# Patient Record
Sex: FEMALE | Race: BLACK OR AFRICAN AMERICAN | URBAN - METROPOLITAN AREA
[De-identification: names, ages, dates, MRNs, and addresses within clinical notes are randomized per-mention and may not be internally consistent; named-entity substitution may affect disease eponyms.]

---

## 2021-03-09 ENCOUNTER — OFFICE VISIT (OUTPATIENT)
Dept: URBAN - METROPOLITAN AREA CLINIC 92 | Facility: CLINIC | Age: 61
End: 2021-03-09
Payer: COMMERCIAL

## 2021-03-09 DIAGNOSIS — Z86.010 PERSONAL HISTORY OF COLONIC POLYPS: ICD-10-CM

## 2021-03-09 DIAGNOSIS — K59.01 CONSTIPATION BY DELAYED COLONIC TRANSIT: ICD-10-CM

## 2021-03-09 DIAGNOSIS — R10.12 LEFT UPPER QUADRANT ABDOMINAL PAIN: ICD-10-CM

## 2021-03-09 PROBLEM — 428283002: Status: ACTIVE | Noted: 2021-03-09

## 2021-03-09 PROCEDURE — 99214 OFFICE O/P EST MOD 30 MIN: CPT | Performed by: INTERNAL MEDICINE

## 2021-03-09 NOTE — HPI-OTHER HISTORIES
(Sept 2019) The patient is a 58 year old  female , who presents on referral from Kristen Darling MD , for a gastroenterology evaluation for change in bowel habits . A copy of this document will be sent to the referring provider. The patient notes a change in bowel movements. Prior to three months ago bowel movements were a few times a day but now occurs every few days. A colonoscopy 2017 by Dr. Salvador Mitchell after her small bowel obstruction was normal. She adheres to a predominantly plant-based diet. She admits to hormonal changes due to menopause.

## 2021-03-09 NOTE — HPI-TODAY'S VISIT:
The patient is here for abdominal pain and constipation January 2021.  She was having bowel movements daily but was not having the sensation of complete emptying.  She took Linzess 145 mcg which she had not taken in several months.  After 2 days of taking it once a day her symptoms resolved.  Her last colonoscopy was performed in 2017 by Salvador Mitchell was unremarkable.  Repeat colonoscopy was recommended in 5 years due to personal history of colon polyps.

## 2022-02-15 ENCOUNTER — OFFICE VISIT (OUTPATIENT)
Dept: URBAN - METROPOLITAN AREA CLINIC 92 | Facility: CLINIC | Age: 62
End: 2022-02-15
Payer: COMMERCIAL

## 2022-02-15 ENCOUNTER — DASHBOARD ENCOUNTERS (OUTPATIENT)
Age: 62
End: 2022-02-15

## 2022-02-15 ENCOUNTER — WEB ENCOUNTER (OUTPATIENT)
Dept: URBAN - METROPOLITAN AREA CLINIC 92 | Facility: CLINIC | Age: 62
End: 2022-02-15

## 2022-02-15 VITALS
HEART RATE: 55 BPM | HEIGHT: 64 IN | TEMPERATURE: 96.4 F | BODY MASS INDEX: 30.39 KG/M2 | WEIGHT: 178 LBS | SYSTOLIC BLOOD PRESSURE: 128 MMHG | DIASTOLIC BLOOD PRESSURE: 81 MMHG

## 2022-02-15 DIAGNOSIS — R10.12 LUQ ABDOMINAL PAIN: ICD-10-CM

## 2022-02-15 DIAGNOSIS — K59.01 CONSTIPATION BY DELAYED COLONIC TRANSIT: ICD-10-CM

## 2022-02-15 DIAGNOSIS — Z86.010 PERSONAL HISTORY OF COLON POLYPS: ICD-10-CM

## 2022-02-15 PROCEDURE — 99214 OFFICE O/P EST MOD 30 MIN: CPT | Performed by: INTERNAL MEDICINE

## 2022-02-15 RX ORDER — SODIUM PICOSULFATE, MAGNESIUM OXIDE, AND ANHYDROUS CITRIC ACID 10; 3.5; 12 MG/160ML; G/160ML; G/160ML
160 ML LIQUID ORAL
Qty: 320 MILLILITER | Refills: 0 | OUTPATIENT
Start: 2022-02-15 | End: 2022-02-16

## 2022-02-15 NOTE — HPI-TODAY'S VISIT:
This is a 60 yo female referred by Dr. Yanely Payan for abdominal pain.  A copy of this document garcía be sent to the primary provider.  The patient has a history of SBO with lysis of adhesions 2017  and 2018 however prior to  this she had multiple admissions for SBO treated conservatively.  Two months ago she requested appointment for LUQ pain.  Presuming it was another bowel obstruction she had clear liquids for 5 days and slowly advancing her diet her symptoms resolved.  She has been asymptomatic for 3 weeks.  She has noticed her symptoms are exacerbated with red meat and bread. Per the records she has a history of colon polyps.  Her last colonoscopy 2017 by Dr. Salvador Mitchell was unremarkable.  There is no family history of coon cancer or colon polyps. The patient also reports constipation. However when she increases vegetables and avoids red meat and bread bowel movements occur daily.

## 2022-04-09 PROBLEM — 35298007: Status: ACTIVE | Noted: 2021-03-09

## 2022-04-26 ENCOUNTER — OFFICE VISIT (OUTPATIENT)
Dept: URBAN - METROPOLITAN AREA SURGERY CENTER 16 | Facility: SURGERY CENTER | Age: 62
End: 2022-04-26
Payer: COMMERCIAL

## 2022-04-26 DIAGNOSIS — Z86.010 ADENOMAS PERSONAL HISTORY OF COLONIC POLYPS: ICD-10-CM

## 2022-04-26 PROCEDURE — G8907 PT DOC NO EVENTS ON DISCHARG: HCPCS | Performed by: INTERNAL MEDICINE

## 2022-04-26 PROCEDURE — G0105 COLORECTAL SCRN; HI RISK IND: HCPCS | Performed by: INTERNAL MEDICINE

## 2022-04-30 ENCOUNTER — TELEPHONE ENCOUNTER (OUTPATIENT)
Dept: URBAN - METROPOLITAN AREA CLINIC 121 | Facility: CLINIC | Age: 62
End: 2022-04-30

## 2022-05-01 ENCOUNTER — TELEPHONE ENCOUNTER (OUTPATIENT)
Dept: URBAN - METROPOLITAN AREA CLINIC 121 | Facility: CLINIC | Age: 62
End: 2022-05-01

## 2022-05-01 RX ORDER — CHROMIUM 200 MCG
ONCE A WEEK TABLET ORAL
Status: ACTIVE | COMMUNITY
Start: 2012-02-08

## 2024-11-05 ENCOUNTER — OFFICE VISIT (OUTPATIENT)
Dept: URBAN - METROPOLITAN AREA CLINIC 92 | Facility: CLINIC | Age: 64
End: 2024-11-05
Payer: COMMERCIAL

## 2024-11-05 VITALS
BODY MASS INDEX: 31.69 KG/M2 | HEART RATE: 60 BPM | WEIGHT: 185.6 LBS | SYSTOLIC BLOOD PRESSURE: 114 MMHG | DIASTOLIC BLOOD PRESSURE: 71 MMHG | HEIGHT: 64 IN | TEMPERATURE: 97 F

## 2024-11-05 DIAGNOSIS — R10.12 LUQ ABDOMINAL PAIN: ICD-10-CM

## 2024-11-05 DIAGNOSIS — K59.01 CONSTIPATION BY DELAYED COLONIC TRANSIT: ICD-10-CM

## 2024-11-05 PROCEDURE — 99215 OFFICE O/P EST HI 40 MIN: CPT | Performed by: INTERNAL MEDICINE

## 2024-11-05 RX ORDER — VITAMIN B COMPLEX
AS DIRECTED CAPSULE ORAL
Status: ACTIVE | COMMUNITY

## 2024-11-05 RX ORDER — CHROMIUM 200 MCG
ONCE A WEEK TABLET ORAL
Status: ACTIVE | COMMUNITY
Start: 2012-02-08

## 2024-11-05 NOTE — HPI-TODAY'S VISIT:
This is a 62 yo female referred by Dr. Yanely Payan for abdominal pain.  A copy of this document garcía be sent to the primary provider.  The patient has a history of SBO with lysis of adhesions 2017  and 2018 however prior to  this she had multiple admissions for SBO treated conservatively.  Two months ago she requested appointment for LUQ pain.  Presuming it was another bowel obstruction she had clear liquids for 5 days and slowly advancing her diet her symptoms resolved.  She has been asymptomatic for 3 weeks.  She has noticed her symptoms are exacerbated with red meat and bread. Per the records she has a history of colon polyps.  Her last colonoscopy 2017 by Dr. Salvador Mitchell was unremarkable.  There is no family history of coon cancer or colon polyps. The patient also reports constipation. However when she increases vegetables and avoids red meat and bread bowel movements occur daily.  ***11/2024: Presented to [facility] ED 3 days ago with LUQ abdominal pain, NV for one day. One small BM at that time and 3 episodes of vomiting. Some distention. Labs notable for WBC 12. CT with no acute abnormality. Still adhering to a mostly liquid diet, and no BMs since her ER visit. Overall feeling better but worried will worsen. No sick contacts. Sxs started after ate a few salads.

## 2025-01-22 ENCOUNTER — OFFICE VISIT (OUTPATIENT)
Dept: URBAN - METROPOLITAN AREA CLINIC 92 | Facility: CLINIC | Age: 65
End: 2025-01-22

## 2025-01-22 RX ORDER — CHROMIUM 200 MCG
ONCE A WEEK TABLET ORAL
Status: ACTIVE | COMMUNITY
Start: 2012-02-08

## 2025-01-22 RX ORDER — VITAMIN B COMPLEX
AS DIRECTED CAPSULE ORAL
Status: ACTIVE | COMMUNITY

## 2025-01-22 NOTE — HPI-TODAY'S VISIT:
This is a 60 yo female referred by Dr. Yanely Payan for abdominal pain.  A copy of this document garcía be sent to the primary provider.  The patient has a history of SBO with lysis of adhesions 2017  and 2018 however prior to  this she had multiple admissions for SBO treated conservatively.  Two months ago she requested appointment for LUQ pain.  Presuming it was another bowel obstruction she had clear liquids for 5 days and slowly advancing her diet her symptoms resolved.  She has been asymptomatic for 3 weeks.  She has noticed her symptoms are exacerbated with red meat and bread. Per the records she has a history of colon polyps.  Her last colonoscopy 2017 by Dr. Salvador Mitchell was unremarkable.  There is no family history of coon cancer or colon polyps. The patient also reports constipation. However when she increases vegetables and avoids red meat and bread bowel movements occur daily.  ***11/2024: Presented to [facility] ED 3 days ago with LUQ abdominal pain, NV for one day. One small BM at that time and 3 episodes of vomiting. Some distention. Labs notable for WBC 12. CT with no acute abnormality. Still adhering to a mostly liquid diet, and no BMs since her ER visit. Overall feeling better but worried will worsen. No sick contacts. Sxs started after ate a few salads. Last colonoscopy was 4-2022 this demonstrated no abnormalities

## 2025-01-24 ENCOUNTER — OFFICE VISIT (OUTPATIENT)
Dept: URBAN - METROPOLITAN AREA CLINIC 92 | Facility: CLINIC | Age: 65
End: 2025-01-24
Payer: COMMERCIAL

## 2025-01-24 VITALS
HEART RATE: 67 BPM | BODY MASS INDEX: 32.58 KG/M2 | HEIGHT: 64 IN | DIASTOLIC BLOOD PRESSURE: 60 MMHG | WEIGHT: 190.8 LBS | TEMPERATURE: 96.4 F | SYSTOLIC BLOOD PRESSURE: 110 MMHG

## 2025-01-24 DIAGNOSIS — Z87.19 HISTORY OF SMALL BOWEL OBSTRUCTION: ICD-10-CM

## 2025-01-24 DIAGNOSIS — R14.2 BELCHING: ICD-10-CM

## 2025-01-24 DIAGNOSIS — R14.0 ABDOMINAL BLOATING: ICD-10-CM

## 2025-01-24 PROCEDURE — 99214 OFFICE O/P EST MOD 30 MIN: CPT

## 2025-01-24 RX ORDER — VITAMIN B COMPLEX
AS DIRECTED CAPSULE ORAL
Status: ACTIVE | COMMUNITY

## 2025-01-24 RX ORDER — CHROMIUM 200 MCG
ONCE A WEEK TABLET ORAL
Status: ACTIVE | COMMUNITY
Start: 2012-02-08

## 2025-01-24 RX ORDER — PROGESTERONE 100 MG/1
1 CAPSULE AT BEDTIME CAPSULE ORAL ONCE A DAY
Status: ACTIVE | COMMUNITY

## 2025-01-24 RX ORDER — ESTRADIOL 0.05 MG/D
1 PATCH TO SKIN PATCH TRANSDERMAL
Status: ACTIVE | COMMUNITY

## 2025-01-24 NOTE — HPI-TODAY'S VISIT:
64-year-old female patient presents today for abdominal bloating.   She has history of SBOs that was originally diagnosed in 2017.  She was hospitalized multiple times after for SBOs and treated conservatively.  In 2018 she had a lysis of adhesions which significantly improved her symptoms.  She had not had a flare until November 2024.  She presented to [facility] and had a CT scan that demonstrated no acute abnormalities. Labs demonstrated normal CMP, lipase, lactic acid and magnesium.  WBC was 12. She saw Dr. Leigh for a follow-up after and she was recommended to see Dr. Tubbs.  She notes she did call their office however they never received a referral so she did not end up seeing him.  Today she notes she was asymptomatic up until 3 weeks ago.  She started to notice epigastric abdominal pain that lasted for 4 hours.  She states she had increased belching during this episode.  This episode was not as severe as it has been in the past.  Denied any nausea, vomiting, fever, chills.  She has been having normal bowel movements daily with no constipation, diarrhea, hematochezia or melena.  She notes outside of her episodes she has occasional bloating and indigestion which is typically related to foods.  She has not tried any medications for this. She had a colonoscopy in 2017 and 2022 both demonstrated no abnormalities.  No family history of GI related cancers or conditions.

## 2025-01-28 LAB — H PYLORI BREATH TEST: NOT DETECTED

## 2025-02-06 ENCOUNTER — TELEPHONE ENCOUNTER (OUTPATIENT)
Dept: URBAN - METROPOLITAN AREA CLINIC 92 | Facility: CLINIC | Age: 65
End: 2025-02-06

## 2025-02-06 PROBLEM — 197125005: Status: ACTIVE | Noted: 2025-02-06

## 2025-02-06 RX ORDER — RIFAXIMIN 550 MG/1
1 TABLET TABLET ORAL THREE TIMES A DAY
Qty: 42 TABLET | Refills: 0 | OUTPATIENT
Start: 2025-02-06 | End: 2025-02-20

## 2025-02-10 ENCOUNTER — TELEPHONE ENCOUNTER (OUTPATIENT)
Dept: URBAN - METROPOLITAN AREA CLINIC 92 | Facility: CLINIC | Age: 65
End: 2025-02-10

## 2025-03-14 ENCOUNTER — OFFICE VISIT (OUTPATIENT)
Dept: URBAN - METROPOLITAN AREA CLINIC 92 | Facility: CLINIC | Age: 65
End: 2025-03-14
Payer: COMMERCIAL

## 2025-03-14 VITALS
DIASTOLIC BLOOD PRESSURE: 65 MMHG | HEIGHT: 64 IN | HEART RATE: 67 BPM | BODY MASS INDEX: 32.64 KG/M2 | WEIGHT: 191.2 LBS | TEMPERATURE: 97.3 F | SYSTOLIC BLOOD PRESSURE: 113 MMHG

## 2025-03-14 DIAGNOSIS — K63.8219 SMALL INTESTINAL BACTERIAL OVERGROWTH (SIBO): ICD-10-CM

## 2025-03-14 DIAGNOSIS — R19.5 LOOSE BOWEL MOVEMENT: ICD-10-CM

## 2025-03-14 DIAGNOSIS — Z87.19 HISTORY OF SMALL BOWEL OBSTRUCTION: ICD-10-CM

## 2025-03-14 PROCEDURE — 99213 OFFICE O/P EST LOW 20 MIN: CPT

## 2025-03-14 RX ORDER — CHROMIUM 200 MCG
ONCE A WEEK TABLET ORAL
Status: ACTIVE | COMMUNITY
Start: 2012-02-08

## 2025-03-14 RX ORDER — PROGESTERONE 100 MG/1
1 CAPSULE AT BEDTIME CAPSULE ORAL ONCE A DAY
Status: ACTIVE | COMMUNITY

## 2025-03-14 RX ORDER — ESTRADIOL 0.05 MG/D
1 PATCH TO SKIN PATCH TRANSDERMAL
Status: ACTIVE | COMMUNITY

## 2025-03-14 RX ORDER — VITAMIN B COMPLEX
AS DIRECTED CAPSULE ORAL
Status: DISCONTINUED | COMMUNITY

## 2025-03-14 RX ORDER — ATORVASTATIN CALCIUM 40 MG/1
1 TABLET TABLET, FILM COATED ORAL ONCE A DAY
Status: ACTIVE | COMMUNITY

## 2025-03-14 NOTE — HPI-TODAY'S VISIT:
64-year-old female patient presents today for abdominal bloating.   She has history of SBOs that was originally diagnosed in 2017.  She was hospitalized multiple times after for SBOs and treated conservatively.  In 2018 she had a lysis of adhesions which significantly improved her symptoms.  She had not had a flare until November 2024.  She presented to [facility] and had a CT scan that demonstrated no acute abnormalities. Labs demonstrated normal CMP, lipase, lactic acid and magnesium.  WBC was 12. She saw Dr. Leigh for a follow-up afterand she was recommended to see Dr. Tubbs.  She notes she did call their office however they never received a referralso she did not end up seeing him.  At last visit she noted she was asymptomatic up until 3 weeks ago.  She started to notice epigastric abdominal pain that lasted for 4 hours.  She states she had increased belching during this episode.  This episode was not as severe as it has been in the past.  Denied any nausea, vomiting, fever, chills. After last visit H. pylori breath test was negative. SIBO test was positive for hydrogen. Patient was sent Xifaxan and has noted improvement in her sx. She does not feel as bloating and quesy as she has in the past.   She has been having normal bowel movements daily with no constipation, diarrhea, hematochezia or melena. For the past 3 days has noted more "oily" stool. Notes this has happened in the past and comes and goes.  She had a colonoscopy in 2017 and 2022 both demonstrated no abnormalities.  No family history of GI related cancers or conditions.

## 2025-03-28 LAB — PANCREATIC ELASTASE, FECAL: >800

## 2025-06-13 ENCOUNTER — OFFICE VISIT (OUTPATIENT)
Dept: URBAN - METROPOLITAN AREA CLINIC 92 | Facility: CLINIC | Age: 65
End: 2025-06-13
Payer: COMMERCIAL

## 2025-06-13 DIAGNOSIS — R19.5 LOOSE BOWEL MOVEMENT: ICD-10-CM

## 2025-06-13 DIAGNOSIS — Z87.19 HISTORY OF SMALL BOWEL OBSTRUCTION: ICD-10-CM

## 2025-06-13 DIAGNOSIS — K63.8219 SMALL INTESTINAL BACTERIAL OVERGROWTH (SIBO): ICD-10-CM

## 2025-06-13 PROCEDURE — 99213 OFFICE O/P EST LOW 20 MIN: CPT

## 2025-06-13 RX ORDER — CHROMIUM 200 MCG
ONCE A WEEK TABLET ORAL
Status: ACTIVE | COMMUNITY
Start: 2012-02-08

## 2025-06-13 RX ORDER — PROGESTERONE 100 MG/1
1 CAPSULE AT BEDTIME CAPSULE ORAL ONCE A DAY
Status: ACTIVE | COMMUNITY

## 2025-06-13 RX ORDER — ESTRADIOL 0.05 MG/D
1 PATCH TO SKIN PATCH TRANSDERMAL
Status: ACTIVE | COMMUNITY

## 2025-06-13 NOTE — HPI-TODAY'S VISIT:
64-year-old female patient presents today for abdominal bloating.   She has history of SBOs that was originally diagnosed in 2017.  She was hospitalized multiple times after for SBOs and treated conservatively.  In 2018 she had a lysis of adhesions which significantly improved her symptoms.  She had not had a flare until November 2024.  She presented to [facility] and had a CT scan that demonstrated no acute abnormalities. Labs demonstrated normal CMP, lipase, lactic acid and magnesium.  WBC was 12. She saw Dr. Leigh for a follow-up after and she was recommended to see Dr. Tubbs.  She notes she did call their office however they never received a referralso she did not end up seeing him.  She has had issues with bloating, gas, belching in the past. She was + for SIBO and is sp one round of xifaxan. Her sx resolved with this.  She has been having normal bowel movements daily with no constipation, diarrhea, hematochezia or melena. At last visit noted more "oily" stool. Notes this has happened in the past and comes and goes. After last visit pancreatic elastase was >800. Stool back to normal now.  She had a colonoscopy in 2017 and 2022 both demonstrated no abnormalities.  No family history of GI related cancers or conditions.